# Patient Record
Sex: FEMALE | Race: WHITE | HISPANIC OR LATINO
[De-identification: names, ages, dates, MRNs, and addresses within clinical notes are randomized per-mention and may not be internally consistent; named-entity substitution may affect disease eponyms.]

---

## 2017-03-15 PROBLEM — Z00.00 ENCOUNTER FOR PREVENTIVE HEALTH EXAMINATION: Status: ACTIVE | Noted: 2017-03-15

## 2017-04-10 ENCOUNTER — APPOINTMENT (OUTPATIENT)
Dept: UROGYNECOLOGY | Facility: CLINIC | Age: 57
End: 2017-04-10

## 2021-05-10 ENCOUNTER — TRANSCRIPTION ENCOUNTER (OUTPATIENT)
Age: 61
End: 2021-05-10

## 2021-08-19 ENCOUNTER — TRANSCRIPTION ENCOUNTER (OUTPATIENT)
Age: 61
End: 2021-08-19

## 2021-12-20 ENCOUNTER — TRANSCRIPTION ENCOUNTER (OUTPATIENT)
Age: 61
End: 2021-12-20

## 2022-01-17 ENCOUNTER — TRANSCRIPTION ENCOUNTER (OUTPATIENT)
Age: 62
End: 2022-01-17

## 2022-06-08 ENCOUNTER — NON-APPOINTMENT (OUTPATIENT)
Age: 62
End: 2022-06-08

## 2022-07-20 ENCOUNTER — NON-APPOINTMENT (OUTPATIENT)
Age: 62
End: 2022-07-20

## 2023-02-28 ENCOUNTER — NON-APPOINTMENT (OUTPATIENT)
Age: 63
End: 2023-02-28

## 2023-03-20 ENCOUNTER — APPOINTMENT (OUTPATIENT)
Dept: ORTHOPEDIC SURGERY | Facility: CLINIC | Age: 63
End: 2023-03-20
Payer: COMMERCIAL

## 2023-03-20 VITALS — BODY MASS INDEX: 21.99 KG/M2 | WEIGHT: 132 LBS | HEIGHT: 65 IN

## 2023-03-20 DIAGNOSIS — M51.36 OTHER INTERVERTEBRAL DISC DEGENERATION, LUMBAR REGION: ICD-10-CM

## 2023-03-20 DIAGNOSIS — M54.16 RADICULOPATHY, LUMBAR REGION: ICD-10-CM

## 2023-03-20 DIAGNOSIS — Z78.9 OTHER SPECIFIED HEALTH STATUS: ICD-10-CM

## 2023-03-20 PROCEDURE — 72170 X-RAY EXAM OF PELVIS: CPT

## 2023-03-20 PROCEDURE — 72100 X-RAY EXAM L-S SPINE 2/3 VWS: CPT

## 2023-03-20 PROCEDURE — 99204 OFFICE O/P NEW MOD 45 MIN: CPT

## 2023-03-20 RX ORDER — CYCLOBENZAPRINE HYDROCHLORIDE 10 MG/1
10 TABLET, FILM COATED ORAL 3 TIMES DAILY
Qty: 30 | Refills: 0 | Status: ACTIVE | COMMUNITY
Start: 2023-03-20 | End: 1900-01-01

## 2023-03-20 NOTE — IMAGING
[de-identified] : \par Constitutional:  The patient appears well developed, well nourished.  \par Skin: No impressive skin lesions present, except as noted in detailed exam. \par Lymphatic: No palpable lymphadenopathy in examined body areas. \par Neurologic:  Alert and oriented to time, place and person.   \par Vascular: Capillary refill is normal\par \par Back / Spine:\par Inspection: No erythema and ecchymosis.\par Palpation: left lumbar paraspinal tenderness. \par Range of motion:  Near full range of motion but with mild stiffness. Pain with lateral rotation. pain at extremes of flexion\par Strength Testing: Weakness with left ankle dorsiflexion and EHL strength woith foot drop \par Otherwise 5/5 throughout both lower extremities with normal tone \par Neurological testing: light touch is intact throughout both lower extremities\par Straight Leg Raise: positive left\par [place] test: neg \par \par

## 2023-03-20 NOTE — HISTORY OF PRESENT ILLNESS
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

## 2023-03-20 NOTE — ASSESSMENT
[FreeTextEntry1] : X-Ray Examination of the LUMBAR SPINE 2 views shows: disc space narrowing\par X-Ray Examination of the PELVIS 1 or 2 views shows: there are no fractures, subluxations or dislocations. \par \par Back pain with radiation into lower extremity, pos SLE on exam and weakness and left foot drop\par \par - We discussed their diagnosis and treatment options at length.\par - We will conservative treatment with a course of PT and anti-inflammatory medication.\par - Rx given for Medrol dose pack\par - Discussed the possible side effects of medication along with the timing and frequency for taking.\par - MRI to rule out HNP and other causes of lumbar radiculopathy\par - We also discussed possibility of epidural injection by one of our pain mgmt doctors in the future or surgery by spine given her foot drop and poss need for ealry surgery\par - Follow up after mri with spine given her foot drop\par

## 2023-03-22 ENCOUNTER — TRANSCRIPTION ENCOUNTER (OUTPATIENT)
Age: 63
End: 2023-03-22

## 2023-03-23 ENCOUNTER — APPOINTMENT (OUTPATIENT)
Dept: ORTHOPEDIC SURGERY | Facility: CLINIC | Age: 63
End: 2023-03-23
Payer: COMMERCIAL

## 2023-03-23 VITALS — WEIGHT: 132 LBS | HEIGHT: 65 IN | BODY MASS INDEX: 21.99 KG/M2

## 2023-03-23 VITALS — BODY MASS INDEX: 21.99 KG/M2 | WEIGHT: 132 LBS | HEIGHT: 65 IN

## 2023-03-23 DIAGNOSIS — Z78.9 OTHER SPECIFIED HEALTH STATUS: ICD-10-CM

## 2023-03-23 DIAGNOSIS — M51.26 OTHER INTERVERTEBRAL DISC DISPLACEMENT, LUMBAR REGION: ICD-10-CM

## 2023-03-23 PROCEDURE — 99214 OFFICE O/P EST MOD 30 MIN: CPT

## 2023-03-23 NOTE — ASSESSMENT
[FreeTextEntry1] : MRI reviewed- large extruded fragment at L4-5 with L4 neural impingement which correlates with her symptoms; fortunately the symptoms and weakness have started to improve; its important we get her started with PT to work on strengthening; will keep an eye on this over the next few weeks, if the weakness doesn’t improve may need to undergo a a decompression to avoid permanent neurological dysfunction. \par \par Progress note completed by Maria G Pittman PA-C under the supervision of Gasper Gonzalez MD\par \par

## 2023-03-23 NOTE — PHYSICAL EXAM
[Flexion] : flexion [Extension] : extension [3___] : left dorsiflexors 3[unfilled]/5 [5___] : right extensor hallicus longus 5[unfilled]/5 [] : negative tight hamstring [de-identified] : unable to heel walk on the left

## 2023-03-23 NOTE — HISTORY OF PRESENT ILLNESS
[5] : 5 [de-identified] : 63 yo F c/o LLE pain; states it started after doing kettle bell swings and fell a quick sharp pain in the back; went away the next day but started with pain in the L knee, shin to ankle. Went to , XRs of the LE were done which was negative for fracture. New onset L groin and anterior thigh cramping; did 2 MDPs, flexeril and tramadol without relief. Starting to improve over the last few days. Symptoms with pressure, burning, n/t. No prior back issues. the left lags when she walks. no bb incontinence. \par \par pmhx - OP. had negative rheumatology w/u [] : no [FreeTextEntry1] : L spine  [FreeTextEntry5] : Romana is a 62 year F here for L Spne. Pt seen Dr. Cobian in the past for L spine. Pt has MRI [FreeTextEntry6] : Discomfort [de-identified] : MRI

## 2023-04-20 ENCOUNTER — APPOINTMENT (OUTPATIENT)
Dept: ORTHOPEDIC SURGERY | Facility: CLINIC | Age: 63
End: 2023-04-20
